# Patient Record
Sex: MALE | NOT HISPANIC OR LATINO | URBAN - METROPOLITAN AREA
[De-identification: names, ages, dates, MRNs, and addresses within clinical notes are randomized per-mention and may not be internally consistent; named-entity substitution may affect disease eponyms.]

---

## 2021-11-13 ENCOUNTER — EMERGENCY (EMERGENCY)
Facility: HOSPITAL | Age: 61
LOS: 1 days | Discharge: HOME | End: 2021-11-13
Attending: EMERGENCY MEDICINE | Admitting: EMERGENCY MEDICINE
Payer: SELF-PAY

## 2021-11-13 VITALS — RESPIRATION RATE: 18 BRPM | OXYGEN SATURATION: 94 %

## 2021-11-13 VITALS
OXYGEN SATURATION: 93 % | HEART RATE: 87 BPM | DIASTOLIC BLOOD PRESSURE: 81 MMHG | TEMPERATURE: 98 F | RESPIRATION RATE: 18 BRPM | SYSTOLIC BLOOD PRESSURE: 114 MMHG | WEIGHT: 156.09 LBS | HEIGHT: 69 IN
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
## 2021-11-13 DIAGNOSIS — R41.82 ALTERED MENTAL STATUS, UNSPECIFIED: ICD-10-CM

## 2021-11-13 DIAGNOSIS — F10.129 ALCOHOL ABUSE WITH INTOXICATION, UNSPECIFIED: ICD-10-CM

## 2021-11-13 PROCEDURE — 99284 EMERGENCY DEPT VISIT MOD MDM: CPT

## 2021-11-13 NOTE — ED PROVIDER NOTE - CLINICAL SUMMARY MEDICAL DECISION MAKING FREE TEXT BOX
Pt presents via EMS with AMS secondary to EtOH intoxication. Pt is asking to be discharge. No other medical complaints. Pt presents via EMS with AMS secondary to EtOH intoxication. Pt is asking to be discharge. No other medical complaints.    ED evaluation and management discussed with the patient and family (if available) in detail.  Close PMD follow up encouraged.  Strict ED return instructions discussed in detail and patient given the opportunity to ask any questions about their discharge diagnosis and instructions. Patient verbalized understanding.    alert with fluent and appropriate speech and steady gait at time of dc home

## 2021-11-13 NOTE — ED ADULT NURSE NOTE - CHIEF COMPLAINT QUOTE
BIBA for public intoxication. drinking vodka. +has walker at bedside. yelling "I am hungry!" FS 98 in the field

## 2021-11-13 NOTE — ED PROVIDER NOTE - PATIENT PORTAL LINK FT
You can access the FollowMyHealth Patient Portal offered by [facility] by registering at the following website: [email or web address]. By joining [facility]’s FollowMyHealth portal, you will also be able to view your health information using other applications (apps) compatible with our system.

## 2021-11-13 NOTE — ED ADULT TRIAGE NOTE - CHIEF COMPLAINT QUOTE
BIBA for public intoxication. drinking vodka. +has walker at bedside. yelling "I am hungry!" BIBA for public intoxication. drinking vodka. +has walker at bedside. yelling "I am hungry!" FS 98 in the field

## 2021-11-13 NOTE — ED ADULT NURSE REASSESSMENT NOTE - NS ED NURSE REASSESS COMMENT FT1
Pt received from [place] RN. Pt resting comfortably in bed. No s/s of acute distress. Will continue to monitor

## 2021-11-13 NOTE — ED PROVIDER NOTE - PHYSICAL EXAMINATION
Appearance: POOR HYGIENE, UNKEMPT, +AOB  Development: well developed  Distress: no apparent  Manner: appropriate for situation  Mentation: ALTERED LOC, +AOB, + nystagmus  Nourishment: well  ENMT: Airway patent, Nasal mucosa clear. Mouth with normal mucosa. Throat has no vesicles, no oropharyngeal exudates and uvula is midline.  EYES: Clear bilaterally, pupils equal, round and reactive to light. + nystagmus  CARDIAC: Normal rate, regular rhythm.  Heart sounds S1, S2.  No murmurs, rubs or gallops.  RESPIRATORY: Breath sounds clear and equal bilaterally.  GASTROINTESTINAL: Abdomen soft, non-tender, no guarding.  MUSCULOSKELETAL: Spine appears normal  NEURO NERVE: gag reflex intact  NEURO SPEECH: + dysarthria, + dysmetria  NEURO SENSATION: present and normal in 4 extremities  NEURO MOTOR: normal  SKIN: Skin normal color for race, warm, dry and intact. No evidence of rash.

## 2021-11-13 NOTE — ED PROVIDER NOTE - NEUROLOGICAL, MLM
awake speech and slow but appropriate and oriented, no focal deficits, no motor or sensory deficits.

## 2021-11-13 NOTE — ED ADULT NURSE NOTE - NSIMPLEMENTINTERV_GEN_ALL_ED
Implemented All Fall Risk Interventions:  [place] to call system. Call bell, personal items and telephone within reach. Instruct patient to call for assistance. Room bathroom lighting operational. Non-slip footwear when patient is off stretcher. Physically safe environment: no spills, clutter or unnecessary equipment. Stretcher in lowest position, wheels locked, appropriate side rails in place. Provide visual cue, wrist band, yellow gown, etc. Monitor gait and stability. Monitor for mental status changes and reorient to person, place, and time. Review medications for side effects contributing to fall risk. Reinforce activity limits and safety measures with patient and family.

## 2021-11-13 NOTE — ED ADULT NURSE NOTE - CAS ELECT INFOMATION PROVIDED
Pt seen and dc by md miner. Pt refused vital signs/dc papers. Provided with metrocard. A&Ox3 speaking in full sentences. Ambulatory with steady gait/Other

## 2021-11-13 NOTE — ED PROVIDER NOTE - OBJECTIVE STATEMENT
62 y/o M with unknown PMHx presents to ED via EMS with AMS, secondary to alcohol intoxication. Pt is unkempt and have no apparently abrasions. "BIBA for public intoxicaiton. drinking vodka. +has walker at bedside. yellow "I am hungry!" FS 98 in the field"  Triage VS: HR: 87 BP: 114/81 Resp.: 18 Temp. 97.9F O2: 94     62 y/o M with unknown PMHx presents to ED via EMS with AMS, secondary to alcohol intoxication. Pt is unkempt and have no apparently abrasions.
